# Patient Record
Sex: FEMALE
[De-identification: names, ages, dates, MRNs, and addresses within clinical notes are randomized per-mention and may not be internally consistent; named-entity substitution may affect disease eponyms.]

---

## 2019-07-30 PROBLEM — Z00.00 ENCOUNTER FOR PREVENTIVE HEALTH EXAMINATION: Status: ACTIVE | Noted: 2019-07-30

## 2019-07-31 ENCOUNTER — APPOINTMENT (OUTPATIENT)
Dept: OTOLARYNGOLOGY | Facility: CLINIC | Age: 34
End: 2019-07-31
Payer: COMMERCIAL

## 2019-07-31 VITALS
HEIGHT: 65 IN | WEIGHT: 250 LBS | SYSTOLIC BLOOD PRESSURE: 124 MMHG | HEART RATE: 88 BPM | DIASTOLIC BLOOD PRESSURE: 86 MMHG | BODY MASS INDEX: 41.65 KG/M2

## 2019-07-31 DIAGNOSIS — Z82.49 FAMILY HISTORY OF ISCHEMIC HEART DISEASE AND OTHER DISEASES OF THE CIRCULATORY SYSTEM: ICD-10-CM

## 2019-07-31 DIAGNOSIS — K14.0 GLOSSITIS: ICD-10-CM

## 2019-07-31 PROCEDURE — 99202 OFFICE O/P NEW SF 15 MIN: CPT

## 2019-07-31 RX ORDER — NYSTATIN 100000 [USP'U]/ML
100000 SUSPENSION ORAL
Qty: 250 | Refills: 1 | Status: ACTIVE | COMMUNITY
Start: 2019-07-31 | End: 1900-01-01

## 2019-07-31 NOTE — HISTORY OF PRESENT ILLNESS
[de-identified] : JAYY NIETO is a 34 year old female who comes in complaining of furrow in her tongue.  This gets uncomfortable after eating certain foods and has been present for many  years. She denies any recurrent dental infections or significant reflux or gingivitis.The patient had no other ear nose or throat complaints at this visit.

## 2019-07-31 NOTE — ASSESSMENT
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  I suggested a trial of nystatin swish and spit blood explained this probably is not primarily fungal. I did not culture at this point as I was going to treat for fungus. However, I also suggested a oral pathology evaluation. I explained that sometimes this can be more uncomfortable if she is refluxing after eating but she does not feel that is an issue and I did not to treat. The option of Magic mouthwash for symptomatic relief was discussed but not suggested

## 2019-07-31 NOTE — PHYSICAL EXAM
[FreeTextEntry1] : \par The patient was alert and oriented and in no distress.\par Voice was clear.\par \par Face:\par The patient had no facial asymmetry or mass.\par The skin was unremarkable.\par \par Eyes:\par The pupils were equal round and reactive to light and accommodation.\par There was no significant nystagmus or disconjugate gaze noted.\par \par Nose: \par The external nose had no significant deformity.  There was no facial tenderness.  On anterior rhinoscopy, the nasal mucosa was clear.  The anterior septum was midline.  There were no visualized polyps purulence  or masses.\par \par Oral cavity:\par The oral mucosa was normal.\par  she has moderately deep furrowing of the tongue without mass\par Gingival al and buccal mucosa were moist and without lesions.\par The palate moved well.\par There was no cleft to the palate.\par There appeared to be good salivary flow.  \par There was no pus, erythema or mass in the oral cavity.\par \par \par Ears:\par The external ears were normal without deformity.\par The ear canals were clear.\par The tympanic membranes were intact and normal.\par \par Neck: \par The neck was symmetrical.\par The parotid and submandibular glands were normal without masses.\par The trachea was midline and there was no unusual crepitus.\par The thyroid was smooth and nontender and no masses were palpated.\par There was no significant cervical adenopathy.\par \par \par Neuro:\par Neurologically, the patient was awake, alert, and oriented to person, place and time. There were no obvious focal neurologic abnormalities.  Cranial nerves II through XII were grossly intact.\par \par \par TMJ:\par The temporomandibular joints were nontender.\par There was no abnormal crepitus and no significant malocclusion\par